# Patient Record
Sex: FEMALE | Race: WHITE | NOT HISPANIC OR LATINO | Employment: OTHER | URBAN - METROPOLITAN AREA
[De-identification: names, ages, dates, MRNs, and addresses within clinical notes are randomized per-mention and may not be internally consistent; named-entity substitution may affect disease eponyms.]

---

## 2019-04-04 ENCOUNTER — TELEPHONE (OUTPATIENT)
Dept: GENETICS | Facility: HOSPITAL | Age: 69
End: 2019-04-04

## 2019-04-18 ENCOUNTER — CLINICAL SUPPORT (OUTPATIENT)
Dept: GENETICS | Facility: HOSPITAL | Age: 69
End: 2019-04-18

## 2019-04-18 ENCOUNTER — APPOINTMENT (OUTPATIENT)
Dept: LAB | Facility: HOSPITAL | Age: 69
End: 2019-04-18

## 2019-04-18 DIAGNOSIS — Z80.3 FAMILY HISTORY OF BREAST CANCER: Primary | ICD-10-CM

## 2019-04-18 DIAGNOSIS — Z13.79 GENETIC TESTING: Primary | ICD-10-CM

## 2019-04-18 PROCEDURE — 96040: CPT | Performed by: GENETIC COUNSELOR, MS

## 2019-04-19 NOTE — PROGRESS NOTES
Aury Mejía is a 69-year-old female who was referred for genetic counseling due to a family history of breast cancer. Ms. Mejía has no personal history of cancer. She was 11-12 years old at menarche and had her first child at 20. She is post-menopausal and had a CHRIS-BSO in her 30s. Her current cancer screening includes annual clinical breast exam and annual mammogram. She was interested in discussing her risk for a hereditary cancer syndrome.  Ms. Mejía was interested in pursuing genetic testing, and therefore, the [facility] Cancer Panel was ordered which analyzes 47 genes associated with an increased cancer risk. Results from this testing are expected in approximately 2-3 weeks.    FAMILY HISTORY (see attached pedigree):    Mother:  Breast cancer, 41  Mat. Aunt:  Mouth cancer, 46    We do not have medical records confirming the diagnoses in Ms. Mejía’s family.    RISK ASSESSMENT:  Ms. Mejía’s family history of breast cancer led to concern regarding a hereditary cancer syndrome.  She meets NCCN guidelines criteria for BRCA1/2 testing based on family history of early onset breast cancer. Medicare does not cover testing for individuals who have not themselves had a cancer diagnosis; however, [facility], a genetic testing laboratory, will provide testing to Medicare patients in these circumstances when no affected relatives are available for testing.  Ms. Mejía opted to pursue multigene panel testing through [facility]. If genetic testing is negative, Ms. Mejía’s management should be guided by family history. These risk assessments are based on the family history information provided at the time of the appointment.  The assessments could change in the future should new information be obtained.    GENETIC COUNSELING (35 minutes):  We reviewed the family history information in detail. Cases of cancer follow three general patterns: sporadic, familial, and hereditary.  While most cancer is sporadic, some cases appear to  occur in family clusters.  These cases are said to be familial and account for 10-20% of cancer cases.  Familial cases may be due to a combination of shared genes and environmental factors among family members.  In even fewer families, the cancer is said to be inherited, and the genes responsible for the cancer are known.      Family histories typical of hereditary cancer syndromes usually include multiple first- and second-degree relatives diagnosed with cancer types that define a syndrome.  These cases tend to be diagnosed at younger-than-expected ages and can be bilateral or multifocal.  The cancer in these families follows an autosomal dominant inheritance pattern, which indicates the likely presence of a mutation in a cancer susceptibility gene.  Children and siblings of an individual believed to carry this mutation have a 50% chance of inheriting that mutation, thereby inheriting the increased risk to develop cancer.  These mutations can be passed down from the maternal or the paternal lineage.    Based on Ms. Mejía’ family history of breast cancer, we discussed that hereditary breast cancer accounts for 5-10% of all cases of breast cancer.  A significant proportion of hereditary breast cancer can be attributed to mutations in the BRCA1 and BRCA2 genes.  Mutations in these genes confer an increased risk for breast cancer, ovarian cancer, male breast cancer, prostate cancer, and pancreatic cancer.  Women with a BRCA1 or BRCA2 mutation have up to an 87% lifetime risk of breast cancer and up to a 44% risk of ovarian cancer. We discussed that there are other, more rare, hereditary cancer syndromes. Based on Ms. Mejía’ family history and her desire to get more information regarding her personal risks she opted to pursue testing through a panel evaluating several other genes known to increase the risk for cancer.    GENETIC TESTING:  The risks, benefits and limitations of genetic testing and implications for  clinical management following testing were reviewed. DNA test results can influence decisions regarding screening and prevention.  Genetic testing can have significant psychological implications for both individuals and families. Also discussed was the possibility of employment and insurance discrimination based on genetic test results and the federal and states laws that are in place to prevent this.         We discussed panel testing, which would involve testing 42 genes associated with increased cancer risk. The benefits and limitations of genetic testing were discussed.  The implications of a positive or negative test result were discussed.  We also discussed the importance of testing on an affected relative. We discussed the possibility that, in some cases, genetic test results may be ambiguous due to the identification of a genetic variant. These variants may or may not be associated with an increased cancer risk. With multigene panel testing, it is not uncommon for a variant of uncertain significance (VUS) to be identified.  If a VUS is identified, testing family members is not recommended and screening recommendations are made based on the family history.  The laboratories that perform genetic testing work to reclassify the VUS and send out an amended report if and when a VUS is reclassified.  The majority of variant findings are ultimately reclassified to a negative result. Given her family history, a negative test result does not eliminate all cancer risk, although the risk would not be as high as it would with positive genetic testing.     PLAN:  Genetic testing via the [facility] Cancer Panel was ordered and results are expected in 2-3 weeks. We will contact MsFrancisca Marykiarra with her results once they are received.      Ibis Ball MS, [facility] [place], [facility]  Licensed Certified Genetic Counselor

## 2019-05-03 ENCOUNTER — DOCUMENTATION (OUTPATIENT)
Dept: GENETICS | Facility: HOSPITAL | Age: 69
End: 2019-05-03

## 2019-05-03 NOTE — PROGRESS NOTES
Aury Mejía is a 69-year-old female who was referred for genetic counseling due to a family history of breast cancer. Ms. Mejía has no personal history of cancer. She was 11-12 years old at menarche and had her first child at 20. She is post-menopausal and had a CHRIS-BSO in her 30s. Her current cancer screening includes annual clinical breast exam and annual mammogram. She was interested in discussing her risk for a hereditary cancer syndrome.  Ms. Mejía was interested in pursuing genetic testing, and therefore, the [facility] Cancer Panel was ordered which analyzes 47 genes associated with an increased cancer risk. Genetic testing was negative for known deleterious/pathogenic (harmful) mutations in the 47 genes on this multigene panel (see attached results).  While no known deleterious mutations were identified, a variant of uncertain significance (VUS) was identified in the  SDHB gene.  Variants are changes in DNA that may or may not affect the function of the gene.  It is not uncommon for VUS’s to be reported during multigene panel testing, given the number of genes being evaluated and the presence of genetic variation in the population.  The majority (estimated to be >90%) of VUS’s are eventually reclassified as benign gene changes. In this case, the family history is not at all suspicious for a pathogenic SDHB mutation, further increasing the likelihood that this is a benign variant.  It is not recommended that other relatives be tested for this VUS, and this VUS does not impact Ms. Mejía’s management.  Management should be guided by family history assessments.  These results were discussed with Ms. Mejía by telephone on 5/3/19.    FAMILY HISTORY (see attached pedigree):    Mother:  Breast cancer, 41  Mat. Aunt:  Mouth cancer, 46    We do not have medical records confirming the diagnoses in Ms. Mejía’s family.    RISK ASSESSMENT:  Ms. Mejía’s family history of breast cancer led to concern regarding a  hereditary cancer syndrome.  She meets NCCN guidelines criteria for BRCA1/2 testing based on family history of early onset breast cancer. Ms. Mejía opted to pursue multigene panel testing through [facility]. If genetic testing is negative, a family history based risk assessment can be performed for Ms. Mejía and other female relatives.       Since genetic testing was negative for known pathogenic mutations, Ms. Mejía’s lifetime risk of developing breast cancer should be assessed using family history-based risk assessment models.  These models take into account family history and personal history factors, including age at menarche, age at first birth, breast biopsies, age at menopause, etc.  Using these models, Ms. Mejía’s breast cancer risk is estimated to be 5.0% (CHETAN/Reinier-Brie), which is very slightly elevated over the average 71-year-old woman’s lifetime risk 4.1%.   A lifetime risk greater than 20% warrants consideration of increased screening, therefore Ms. Mejía does not fall into this category. Ms. Mejía’s daughter could also have a risk assessment performed using the Tyrer-Cuzick model that would estimate her risk for breast cancer based on the family history.  If the lifetime breast cancer risk is estimated to be greater than 20%, increased screening includes annual breast MRI in addition to annual mammogram. These risk assessments are based on the family history information provided at the time of the appointment. The assessments could change in the future should new information be obtained.    GENETIC COUNSELING:  We reviewed the family history information in detail. Cases of cancer follow three general patterns: sporadic, familial, and hereditary.  While most cancer is sporadic, some cases appear to occur in family clusters.  These cases are said to be familial and account for 10-20% of cancer cases.  Familial cases may be due to a combination of shared genes and environmental factors among family  members.  In even fewer families, the cancer is said to be inherited, and the genes responsible for the cancer are known.      Family histories typical of hereditary cancer syndromes usually include multiple first- and second-degree relatives diagnosed with cancer types that define a syndrome.  These cases tend to be diagnosed at younger-than-expected ages and can be bilateral or multifocal.  The cancer in these families follows an autosomal dominant inheritance pattern, which indicates the likely presence of a mutation in a cancer susceptibility gene.  Children and siblings of an individual believed to carry this mutation have a 50% chance of inheriting that mutation, thereby inheriting the increased risk to develop cancer.  These mutations can be passed down from the maternal or the paternal lineage.    Based on Ms. Mejía’s family history of breast cancer, we discussed that hereditary breast cancer accounts for 5-10% of all cases of breast cancer.  A significant proportion of hereditary breast cancer can be attributed to mutations in the BRCA1 and BRCA2 genes.  Mutations in these genes confer an increased risk for breast cancer, ovarian cancer, male breast cancer, prostate cancer, and pancreatic cancer.  Women with a BRCA1 or BRCA2 mutation have up to an 87% lifetime risk of breast cancer and up to a 44% risk of ovarian cancer. We discussed that there are other, more rare, hereditary cancer syndromes. Based on Ms. Mejía’s family history and her desire to get more information regarding her personal risks she opted to pursue testing through a panel evaluating several other genes known to increase the risk for cancer.    GENETIC TESTING:  The risks, benefits and limitations of genetic testing and implications for clinical management following testing were reviewed. DNA test results can influence decisions regarding screening and prevention.  Genetic testing can have significant psychological implications for both  individuals and families. Also discussed was the possibility of employment and insurance discrimination based on genetic test results and the federal and states laws that are in place to prevent this.         We discussed panel testing, which would involve testing 47 genes associated with increased cancer risk. The benefits and limitations of genetic testing were discussed.  The implications of a positive or negative test result were discussed.  We also discussed the importance of testing on an affected relative. We discussed the possibility that, in some cases, genetic test results may be ambiguous due to the identification of a genetic variant. These variants may or may not be associated with an increased cancer risk. With multigene panel testing, it is not uncommon for a variant of uncertain significance (VUS) to be identified.  If a VUS is identified, testing family members is not recommended and screening recommendations are made based on the family history.  The laboratories that perform genetic testing work to reclassify the VUS and send out an amended report if and when a VUS is reclassified.  The majority of variant findings are ultimately reclassified to a negative result. Given her family history, a negative test result does not eliminate all cancer risk, although the risk would not be as high as it would with positive genetic testing.       TEST RESULTS:  Genetic testing was negative for known deleterious/pathogenic (harmful) mutations by sequencing and rearrangement testing for the 47 genes on the [facility] Hereditary Cancers panel.    A variant of uncertain significance (VUS) was identified in the SDHB gene, however the majority of VUS’s are ultimately reclassified as benign, and therefore this result is not clinically actionable.  If the variant is ever reclassified a new report will be issued by the laboratory and released directly to the ordering physician, and our office.  We are unable to determine  why Ms. Mejía’s relatives developed cancer at this time.  It is possible that there is a mutation present in the family that Ms. Mejía did not happen to inherit.  At this time, general population screening is recommended for Ms. Mejía. This assessment is based on the information provided at the time of the consultation.      PLAN: Genetic counseling remains available for Ms. Mejía and her family. If Ms. Mejía has any questions, concerns, or updates to the family history she is welcome to call us.       Ibis Ball MS, [facility] [place], [facility]  Licensed Certified Genetic Counselor    Cc: NIELS Velazquez

## 2019-08-12 PROBLEM — G43.909 MIGRAINE: Status: ACTIVE | Noted: 2017-01-18

## 2019-08-12 PROBLEM — E03.9 HYPOTHYROIDISM: Status: ACTIVE | Noted: 2017-01-18

## 2019-08-12 PROBLEM — E78.5 HYPERLIPIDEMIA: Status: ACTIVE | Noted: 2017-01-18

## 2019-10-07 RX ORDER — SODIUM, POTASSIUM,MAG SULFATES 17.5-3.13G
SOLUTION, RECONSTITUTED, ORAL ORAL
Qty: 2 BOTTLE | Refills: 0 | Status: SHIPPED | OUTPATIENT
Start: 2019-10-07

## 2019-10-15 ENCOUNTER — OUTSIDE FACILITY SERVICE (OUTPATIENT)
Dept: GASTROENTEROLOGY | Facility: CLINIC | Age: 69
End: 2019-10-15

## 2019-10-15 ENCOUNTER — LAB REQUISITION (OUTPATIENT)
Dept: LAB | Facility: HOSPITAL | Age: 69
End: 2019-10-15

## 2019-10-15 DIAGNOSIS — Z12.11 ENCOUNTER FOR SCREENING FOR MALIGNANT NEOPLASM OF COLON: ICD-10-CM

## 2019-10-15 PROCEDURE — 88305 TISSUE EXAM BY PATHOLOGIST: CPT | Performed by: INTERNAL MEDICINE

## 2019-10-15 PROCEDURE — 45385 COLONOSCOPY W/LESION REMOVAL: CPT | Performed by: INTERNAL MEDICINE

## 2019-10-16 LAB
CYTO UR: NORMAL
LAB AP CASE REPORT: NORMAL
LAB AP CLINICAL INFORMATION: NORMAL
PATH REPORT.FINAL DX SPEC: NORMAL
PATH REPORT.GROSS SPEC: NORMAL

## 2021-01-21 ENCOUNTER — IMMUNIZATION (OUTPATIENT)
Dept: VACCINE CLINIC | Facility: HOSPITAL | Age: 71
End: 2021-01-21

## 2021-01-21 PROCEDURE — 91300 HC SARSCOV02 VAC 30MCG/0.3ML IM: CPT | Performed by: INTERNAL MEDICINE

## 2021-01-21 PROCEDURE — 0001A: CPT | Performed by: INTERNAL MEDICINE

## 2021-02-10 ENCOUNTER — IMMUNIZATION (OUTPATIENT)
Dept: VACCINE CLINIC | Facility: HOSPITAL | Age: 71
End: 2021-02-10

## 2021-02-10 PROCEDURE — 0002A: CPT | Performed by: INTERNAL MEDICINE

## 2021-02-10 PROCEDURE — 91300 HC SARSCOV02 VAC 30MCG/0.3ML IM: CPT | Performed by: INTERNAL MEDICINE

## 2021-10-01 ENCOUNTER — OFFICE VISIT (OUTPATIENT)
Dept: GASTROENTEROLOGY | Facility: CLINIC | Age: 71
End: 2021-10-01

## 2021-10-01 VITALS
HEART RATE: 73 BPM | HEIGHT: 63 IN | TEMPERATURE: 97.5 F | BODY MASS INDEX: 27.14 KG/M2 | DIASTOLIC BLOOD PRESSURE: 71 MMHG | WEIGHT: 153.2 LBS | SYSTOLIC BLOOD PRESSURE: 147 MMHG

## 2021-10-01 DIAGNOSIS — R05.9 COUGH: Primary | ICD-10-CM

## 2021-10-01 PROCEDURE — 99213 OFFICE O/P EST LOW 20 MIN: CPT | Performed by: NURSE PRACTITIONER

## 2021-10-01 RX ORDER — SOLIFENACIN SUCCINATE 10 MG/1
10 TABLET, FILM COATED ORAL DAILY
COMMUNITY
Start: 2021-09-10

## 2021-10-01 RX ORDER — LEVOTHYROXINE SODIUM 0.1 MG/1
100 TABLET ORAL DAILY
COMMUNITY
Start: 2021-09-30

## 2021-10-01 RX ORDER — OMEPRAZOLE 40 MG/1
40 CAPSULE, DELAYED RELEASE ORAL
Qty: 180 CAPSULE | Refills: 3 | Status: SHIPPED | OUTPATIENT
Start: 2021-10-01 | End: 2022-08-11 | Stop reason: SDUPTHER

## 2021-10-01 RX ORDER — FLUVOXAMINE MALEATE 100 MG
100 TABLET ORAL NIGHTLY
COMMUNITY
Start: 2021-07-29

## 2021-10-01 RX ORDER — AZELASTINE HYDROCHLORIDE 0.5 MG/ML
1 SOLUTION/ DROPS OPHTHALMIC DAILY
COMMUNITY

## 2021-10-01 RX ORDER — FAMOTIDINE 20 MG/1
20 TABLET, FILM COATED ORAL
COMMUNITY
Start: 2021-07-29 | End: 2021-10-01

## 2021-10-01 RX ORDER — BUSPIRONE HYDROCHLORIDE 5 MG/1
5 TABLET ORAL DAILY PRN
COMMUNITY
Start: 2021-06-17

## 2021-10-01 RX ORDER — TRIAMCINOLONE ACETONIDE 55 UG/1
2 SPRAY, METERED NASAL
COMMUNITY

## 2021-10-01 RX ORDER — LEVOCETIRIZINE DIHYDROCHLORIDE 5 MG/1
5 TABLET, FILM COATED ORAL DAILY PRN
COMMUNITY

## 2021-10-01 RX ORDER — OMEPRAZOLE 20 MG/1
20 CAPSULE, DELAYED RELEASE ORAL DAILY
COMMUNITY
Start: 2021-08-09 | End: 2021-10-01 | Stop reason: DRUGHIGH

## 2021-10-01 RX ORDER — CONJUGATED ESTROGENS 0.62 MG/G
0.65 CREAM VAGINAL
COMMUNITY

## 2021-10-01 NOTE — PROGRESS NOTES
GASTROENTEROLOGY OFFICE NOTE  Aury Mejía  [id number]  1950    CARE TEAM  Patient Care Team:  Kaelyn Weber APRN as PCP - General (Nurse Practitioner)    Referring Provider: Kaelyn Weber APRN    Chief Complaint   Patient presents with   • Cough     green sputum        HISTORY OF PRESENT ILLNESS:  Patient is a 71-year-old female who presents today with complaints of a persistent cough that has been ongoing for the past 4 to 5 months.  She is having a hard time sleeping at night.  She clears her throat very frequently.  There are times when she feels she has congestion in her chest and can cough up some green sputum but this is not consistent.  She was started on omeprazole 20 mg daily when her symptoms first began and did not show any improvement, about 5 weeks ago this was increased to omeprazole 20 mg twice daily and she discontinued use of her blood pressure medication.  She has some very minimal improvement, if any with the increase of omeprazole.  Over the past couple of years or so, she has developed problems with acid reflux but only after eating very spicy foods otherwise denies GERD and is also absent dysphagia, odynophagia, early satiety, nausea, vomiting.    She has an upcoming appointment with pulmonology for evaluation.    PAST MEDICAL HISTORY  History reviewed. No pertinent past medical history.     PAST SURGICAL HISTORY  Past Surgical History:   Procedure Laterality Date   • CHOLECYSTECTOMY  2018        MEDICATIONS:    Current Outpatient Medications:   •  budesonide-formoterol (SYMBICORT) 80-4.5 MCG/ACT inhaler, Inhale 2 puffs 2 (Two) Times a Day., Disp: , Rfl:   •  busPIRone (BUSPAR) 5 MG tablet, Take 5 mg by mouth Daily As Needed., Disp: , Rfl:   •  cetirizine (ZYRTEC ALLERGY) 10 MG tablet, Take 10 mg by mouth Daily., Disp: , Rfl:   •  ezetimibe (ZETIA) 10 MG tablet, Take 10 mg by mouth Daily., Disp: , Rfl:   •  raloxifene (EVISTA) 60 MG tablet, Take 60 mg by mouth  Daily., Disp: , Rfl:   •  SUMAtriptan (IMITREX) 100 MG tablet, Take 100 mg by mouth Daily As Needed., Disp: , Rfl:   •  venlafaxine XR (EFFEXOR-XR) 75 MG 24 hr capsule, Take 75 mg by mouth Daily., Disp: , Rfl:   •  zolpidem (AMBIEN) 10 MG tablet, Take 10 mg by mouth every night at bedtime., Disp: , Rfl:   •  albuterol sulfate HFA (PROAIR HFA) 108 (90 Base) MCG/ACT inhaler, Inhale 2 puffs Every 4 (Four) Hours As Needed., Disp: , Rfl:   •  azelastine (OPTIVAR) 0.05 % ophthalmic solution, Apply 1 drop to eye(s) as directed by provider Daily., Disp: , Rfl:   •  conjugated estrogens (Premarin) 0.625 MG/GM vaginal cream, Insert 0.65 mg into the vagina 3 (Three) Times a Week if Needed., Disp: , Rfl:   •  fluvoxaMINE (LUVOX) 100 MG tablet, Take 100 mg by mouth Every Night., Disp: , Rfl:   •  levocetirizine (Xyzal) 5 MG tablet, Take 5 mg by mouth Daily As Needed., Disp: , Rfl:   •  levothyroxine (SYNTHROID, LEVOTHROID) 100 MCG tablet, Take 100 mcg by mouth Daily., Disp: , Rfl:   •  omeprazole (priLOSEC) 40 MG capsule, Take 1 capsule by mouth 2 (Two) Times a Day Before Meals., Disp: 180 capsule, Rfl: 3  •  sodium-potassium-magnesium sulfates (SUPREP BOWEL PREP KIT) 17.5-3.13-1.6 GM/177ML solution oral solution, Please follow the directions mailed to you by our office. If you have any questions call our office at [phone], Disp: 2 bottle, Rfl: 0  •  solifenacin (VESICARE) 10 MG tablet, Take 10 mg by mouth Daily., Disp: , Rfl:   •  Triamcinolone Acetonide (Nasal Allergy 24 Hour) 55 MCG/ACT nasal inhaler, 2 mcg., Disp: , Rfl:     ALLERGIES  Allergies   Allergen Reactions   • Lidocaine Other (See Comments)     Blisters in mouth.   • Lipitor [Atorvastatin] Urinary Retention   • Prilocaine Other (See Comments)     Blisters in mouth.   • Tetracaine Other (See Comments)     Blisters in mouth.   • Spiriva Respimat [Tiotropium Bromide Monohydrate] Rash       FAMILY HISTORY:  History reviewed. No pertinent family history.    SOCIAL  "HISTORY  Social History     Socioeconomic History   • Marital status:      Spouse name: Not on file   • Number of children: Not on file   • Years of education: Not on file   • Highest education level: Not on file   Tobacco Use   • Smoking status: Never Smoker   Vaping Use   • Vaping Use: Never used   Substance and Sexual Activity   • Alcohol use: Not Currently       REVIEW OF SYSTEMS  Review of Systems   Constitutional: Negative for activity change, appetite change, chills, diaphoresis, fatigue, fever, unexpected weight gain and unexpected weight loss.   HENT: Negative for trouble swallowing and voice change.    Respiratory: Positive for cough.    Gastrointestinal: Negative for abdominal distention, abdominal pain, anal bleeding, blood in stool, constipation, diarrhea, nausea, rectal pain, vomiting, GERD and indigestion.         PHYSICAL EXAM   /71 (BP Location: Left arm, Patient Position: Sitting, Cuff Size: Adult)   Pulse 73   Temp 97.5 °F (36.4 °C) (Temporal)   Ht 160 cm (63\")   Wt 69.5 kg (153 lb 3.2 oz)   BMI 27.14 kg/m²   Physical Exam  Constitutional:       General: She is not in acute distress.     Appearance: She is well-developed.   HENT:      Head: Normocephalic and atraumatic.      Nose: Nose normal.   Eyes:      Conjunctiva/sclera: Conjunctivae normal.      Pupils: Pupils are equal, round, and reactive to light.   Pulmonary:      Effort: Pulmonary effort is normal.   Abdominal:      General: Bowel sounds are normal. There is no distension.      Palpations: Abdomen is soft.   Neurological:      Mental Status: She is alert and oriented to person, place, and time.   Psychiatric:         Behavior: Behavior normal.         Judgment: Judgment normal.           ASSESSMENT / PLAN  1.  Persistent cough  -Increase omeprazole to 40 mg twice daily    Return in about 8 weeks (around 11/26/2021).    I discussed the patients findings and my recommendations with patient    Eyal Johnson, " APRN

## 2021-12-14 ENCOUNTER — APPOINTMENT (OUTPATIENT)
Dept: PREADMISSION TESTING | Facility: HOSPITAL | Age: 71
End: 2021-12-14

## 2021-12-14 ENCOUNTER — OFFICE VISIT (OUTPATIENT)
Dept: GASTROENTEROLOGY | Facility: CLINIC | Age: 71
End: 2021-12-14

## 2021-12-14 VITALS
DIASTOLIC BLOOD PRESSURE: 67 MMHG | WEIGHT: 143.6 LBS | SYSTOLIC BLOOD PRESSURE: 156 MMHG | TEMPERATURE: 97.5 F | BODY MASS INDEX: 25.44 KG/M2 | HEART RATE: 72 BPM

## 2021-12-14 DIAGNOSIS — Z20.822 ENCOUNTER FOR PREPROCEDURE SCREENING LABORATORY TESTING FOR COVID-19: Primary | ICD-10-CM

## 2021-12-14 DIAGNOSIS — K21.9 GASTROESOPHAGEAL REFLUX DISEASE, UNSPECIFIED WHETHER ESOPHAGITIS PRESENT: Primary | ICD-10-CM

## 2021-12-14 DIAGNOSIS — Z01.812 ENCOUNTER FOR PREPROCEDURE SCREENING LABORATORY TESTING FOR COVID-19: Primary | ICD-10-CM

## 2021-12-14 LAB — SARS-COV-2 RNA PNL SPEC NAA+PROBE: NOT DETECTED

## 2021-12-14 PROCEDURE — U0005 INFEC AGEN DETEC AMPLI PROBE: HCPCS

## 2021-12-14 PROCEDURE — 99213 OFFICE O/P EST LOW 20 MIN: CPT | Performed by: NURSE PRACTITIONER

## 2021-12-14 PROCEDURE — C9803 HOPD COVID-19 SPEC COLLECT: HCPCS

## 2021-12-14 PROCEDURE — U0004 COV-19 TEST NON-CDC HGH THRU: HCPCS

## 2021-12-14 NOTE — PROGRESS NOTES
GASTROENTEROLOGY OFFICE NOTE  Aury Mejía  [id number]  1950    CARE TEAM  Patient Care Team:  Kaelyn Weber APRN as PCP - General (Nurse Practitioner)    Referring Provider: Kaelyn Weber APRN    Chief Complaint   Patient presents with   • Follow-up   • Cough        HISTORY OF PRESENT ILLNESS:  Patient returns in follow-up with a chronic cough, persistent for the past 4 to 5 months with additional complaints of frequent clearing of the throat.  At her last visit Protonix was increased to 40 mg twice daily.  She has noted some improvement, she states she is better but not completely resolved.  Since her last visit, she has consulted with pulmonology where an esophagram was done that was significant for extensive gastroesophageal reflux.    PAST MEDICAL HISTORY  Past Medical History:   Diagnosis Date   • GERD (gastroesophageal reflux disease)         PAST SURGICAL HISTORY  Past Surgical History:   Procedure Laterality Date   • CHOLECYSTECTOMY  2018   • HYSTERECTOMY  2000   • REPLACEMENT TOTAL KNEE Bilateral 2008   • THYROIDECTOMY, PARTIAL  1990        MEDICATIONS:    Current Outpatient Medications:   •  albuterol sulfate HFA (PROAIR HFA) 108 (90 Base) MCG/ACT inhaler, Inhale 2 puffs Every 4 (Four) Hours As Needed., Disp: , Rfl:   •  azelastine (OPTIVAR) 0.05 % ophthalmic solution, Apply 1 drop to eye(s) as directed by provider Daily., Disp: , Rfl:   •  budesonide-formoterol (SYMBICORT) 80-4.5 MCG/ACT inhaler, Inhale 2 puffs 2 (Two) Times a Day., Disp: , Rfl:   •  busPIRone (BUSPAR) 5 MG tablet, Take 5 mg by mouth Daily As Needed., Disp: , Rfl:   •  cetirizine (ZYRTEC ALLERGY) 10 MG tablet, Take 10 mg by mouth Daily., Disp: , Rfl:   •  conjugated estrogens (Premarin) 0.625 MG/GM vaginal cream, Insert 0.65 mg into the vagina 3 (Three) Times a Week if Needed., Disp: , Rfl:   •  ezetimibe (ZETIA) 10 MG tablet, Take 10 mg by mouth Daily., Disp: , Rfl:   •  fluvoxaMINE (LUVOX) 100 MG tablet,  Take 100 mg by mouth Every Night., Disp: , Rfl:   •  levocetirizine (Xyzal) 5 MG tablet, Take 5 mg by mouth Daily As Needed., Disp: , Rfl:   •  levothyroxine (SYNTHROID, LEVOTHROID) 100 MCG tablet, Take 100 mcg by mouth Daily., Disp: , Rfl:   •  omeprazole (priLOSEC) 40 MG capsule, Take 1 capsule by mouth 2 (Two) Times a Day Before Meals., Disp: 180 capsule, Rfl: 3  •  raloxifene (EVISTA) 60 MG tablet, Take 60 mg by mouth Daily., Disp: , Rfl:   •  sodium-potassium-magnesium sulfates (SUPREP BOWEL PREP KIT) 17.5-3.13-1.6 GM/177ML solution oral solution, Please follow the directions mailed to you by our office. If you have any questions call our office at [phone], Disp: 2 bottle, Rfl: 0  •  solifenacin (VESICARE) 10 MG tablet, Take 10 mg by mouth Daily., Disp: , Rfl:   •  SUMAtriptan (IMITREX) 100 MG tablet, Take 100 mg by mouth Daily As Needed., Disp: , Rfl:   •  Triamcinolone Acetonide (Nasal Allergy 24 Hour) 55 MCG/ACT nasal inhaler, 2 mcg., Disp: , Rfl:   •  venlafaxine XR (EFFEXOR-XR) 75 MG 24 hr capsule, Take 75 mg by mouth Daily., Disp: , Rfl:   •  zolpidem (AMBIEN) 10 MG tablet, Take 10 mg by mouth every night at bedtime., Disp: , Rfl:     ALLERGIES  Allergies   Allergen Reactions   • Atorvastatin Urinary Retention   • Lidocaine Other (See Comments)     Blisters in mouth.   • Prilocaine Other (See Comments)     Blisters in mouth.   • Tetracaine Other (See Comments)     Blisters in mouth.   • Contrast Dye Rash   • Iodine Rash   • Sulfa Antibiotics Rash   • Tiotropium Bromide Monohydrate Rash       FAMILY HISTORY:  Family History   Problem Relation Age of Onset   • Colon cancer Neg Hx    • Colon polyps Neg Hx        SOCIAL HISTORY  Social History     Socioeconomic History   • Marital status:    Tobacco Use   • Smoking status: Never Smoker   • Smokeless tobacco: Never Used   Vaping Use   • Vaping Use: Never used   Substance and Sexual Activity   • Alcohol use: Not Currently   • Drug use: Never   •  Sexual activity: Defer           PHYSICAL EXAM   /67 (BP Location: Left arm, Patient Position: Sitting, Cuff Size: Adult)   Pulse 72   Temp 97.5 °F (36.4 °C) (Temporal)   Wt 65.1 kg (143 lb 9.6 oz)   BMI 25.44 kg/m²   Physical Exam  Constitutional:       General: She is not in acute distress.     Appearance: She is well-developed.   HENT:      Head: Normocephalic and atraumatic.      Nose: Nose normal.   Eyes:      Conjunctiva/sclera: Conjunctivae normal.      Pupils: Pupils are equal, round, and reactive to light.   Pulmonary:      Effort: Pulmonary effort is normal.   Abdominal:      General: There is no distension.      Palpations: Abdomen is soft.      Tenderness: There is no abdominal tenderness.   Neurological:      Mental Status: She is alert and oriented to person, place, and time.   Psychiatric:         Behavior: Behavior normal.         Judgment: Judgment normal.           Results Review:  Exam date: 10/21/2021  Exam: RF esophagus  -Impression 1.  Small sliding hiatus hernia  2.  Significant, extensive gastroesophageal reflux    ASSESSMENT / PLAN  1.  GERD  -Pantoprazole 40 mg twice daily  -EGD    No follow-ups on file.    I discussed the patients findings and my recommendations with patient    NIELS Garcia

## 2021-12-17 ENCOUNTER — OUTSIDE FACILITY SERVICE (OUTPATIENT)
Dept: GASTROENTEROLOGY | Facility: CLINIC | Age: 71
End: 2021-12-17

## 2021-12-17 PROCEDURE — 43239 EGD BIOPSY SINGLE/MULTIPLE: CPT | Performed by: INTERNAL MEDICINE

## 2021-12-17 PROCEDURE — 88305 TISSUE EXAM BY PATHOLOGIST: CPT | Performed by: INTERNAL MEDICINE

## 2021-12-20 ENCOUNTER — LAB REQUISITION (OUTPATIENT)
Dept: LAB | Facility: HOSPITAL | Age: 71
End: 2021-12-20

## 2021-12-20 DIAGNOSIS — K21.9 GASTRO-ESOPHAGEAL REFLUX DISEASE WITHOUT ESOPHAGITIS: ICD-10-CM

## 2021-12-20 DIAGNOSIS — K20.90 ESOPHAGITIS, UNSPECIFIED WITHOUT BLEEDING: ICD-10-CM

## 2021-12-20 DIAGNOSIS — K44.9 DIAPHRAGMATIC HERNIA WITHOUT OBSTRUCTION OR GANGRENE: ICD-10-CM

## 2022-03-02 ENCOUNTER — TELEMEDICINE (OUTPATIENT)
Dept: GASTROENTEROLOGY | Facility: CLINIC | Age: 72
End: 2022-03-02

## 2022-03-02 DIAGNOSIS — K21.9 GASTROESOPHAGEAL REFLUX DISEASE, UNSPECIFIED WHETHER ESOPHAGITIS PRESENT: Primary | ICD-10-CM

## 2022-03-02 PROCEDURE — 99212 OFFICE O/P EST SF 10 MIN: CPT | Performed by: NURSE PRACTITIONER

## 2022-03-02 NOTE — PROGRESS NOTES
GASTROENTEROLOGY OFFICE NOTE  Aury Mejía  [id number]  1950    CARE TEAM  Patient Care Team:  Kaelyn Weber APRN as PCP - General (Nurse Practitioner)    Referring Provider: Kaelyn Weber APRN    Chief Complaint   Patient presents with   • Cough      Mode of Visit: Video  Location of patient: home  You have chosen to receive care through a telehealth visit.  The patient has signed the video visit consent form.  The visit included audio and video interaction. No technical issues occurred during this visit.     HISTORY OF PRESENT ILLNESS:  Ms. Milner returns in follow-up for complaints of persistent cough secondary to GERD. She is been taking pantoprazole 40 mg twice daily for about 8 weeks now and reports that her cough has completely resolved. She denies dysphagia, odynophagia, early satiety, nausea or vomiting.    PAST MEDICAL HISTORY  Past Medical History:   Diagnosis Date   • GERD (gastroesophageal reflux disease)         PAST SURGICAL HISTORY  Past Surgical History:   Procedure Laterality Date   • CHOLECYSTECTOMY  2018   • HYSTERECTOMY  2000   • REPLACEMENT TOTAL KNEE Bilateral 2008   • THYROIDECTOMY, PARTIAL  1990        MEDICATIONS:    Current Outpatient Medications:   •  albuterol sulfate HFA (PROAIR HFA) 108 (90 Base) MCG/ACT inhaler, Inhale 2 puffs Every 4 (Four) Hours As Needed., Disp: , Rfl:   •  azelastine (OPTIVAR) 0.05 % ophthalmic solution, Apply 1 drop to eye(s) as directed by provider Daily., Disp: , Rfl:   •  budesonide-formoterol (SYMBICORT) 80-4.5 MCG/ACT inhaler, Inhale 2 puffs 2 (Two) Times a Day., Disp: , Rfl:   •  busPIRone (BUSPAR) 5 MG tablet, Take 5 mg by mouth Daily As Needed., Disp: , Rfl:   •  cetirizine (ZYRTEC ALLERGY) 10 MG tablet, Take 10 mg by mouth Daily., Disp: , Rfl:   •  conjugated estrogens (Premarin) 0.625 MG/GM vaginal cream, Insert 0.65 mg into the vagina 3 (Three) Times a Week if Needed., Disp: , Rfl:   •  ezetimibe (ZETIA) 10 MG tablet,  Take 10 mg by mouth Daily., Disp: , Rfl:   •  fluvoxaMINE (LUVOX) 100 MG tablet, Take 100 mg by mouth Every Night., Disp: , Rfl:   •  levocetirizine (Xyzal) 5 MG tablet, Take 5 mg by mouth Daily As Needed., Disp: , Rfl:   •  levothyroxine (SYNTHROID, LEVOTHROID) 100 MCG tablet, Take 100 mcg by mouth Daily., Disp: , Rfl:   •  omeprazole (priLOSEC) 40 MG capsule, Take 1 capsule by mouth 2 (Two) Times a Day Before Meals., Disp: 180 capsule, Rfl: 3  •  raloxifene (EVISTA) 60 MG tablet, Take 60 mg by mouth Daily., Disp: , Rfl:   •  sodium-potassium-magnesium sulfates (SUPREP BOWEL PREP KIT) 17.5-3.13-1.6 GM/177ML solution oral solution, Please follow the directions mailed to you by our office. If you have any questions call our office at [phone], Disp: 2 bottle, Rfl: 0  •  solifenacin (VESICARE) 10 MG tablet, Take 10 mg by mouth Daily., Disp: , Rfl:   •  SUMAtriptan (IMITREX) 100 MG tablet, Take 100 mg by mouth Daily As Needed., Disp: , Rfl:   •  Triamcinolone Acetonide (Nasal Allergy 24 Hour) 55 MCG/ACT nasal inhaler, 2 mcg., Disp: , Rfl:   •  venlafaxine XR (EFFEXOR-XR) 75 MG 24 hr capsule, Take 75 mg by mouth Daily., Disp: , Rfl:   •  zolpidem (AMBIEN) 10 MG tablet, Take 10 mg by mouth every night at bedtime., Disp: , Rfl:     ALLERGIES  Allergies   Allergen Reactions   • Atorvastatin Urinary Retention   • Lidocaine Other (See Comments)     Blisters in mouth.   • Prilocaine Other (See Comments)     Blisters in mouth.   • Tetracaine Other (See Comments)     Blisters in mouth.   • Contrast Dye Rash   • Iodine Rash   • Sulfa Antibiotics Rash   • Tiotropium Bromide Monohydrate Rash       FAMILY HISTORY:  Family History   Problem Relation Age of Onset   • Colon cancer Neg Hx    • Colon polyps Neg Hx        SOCIAL HISTORY  Social History     Socioeconomic History   • Marital status:    Tobacco Use   • Smoking status: Never Smoker   • Smokeless tobacco: Never Used   Vaping Use   • Vaping Use: Never used    Substance and Sexual Activity   • Alcohol use: Not Currently   • Drug use: Never   • Sexual activity: Defer           PHYSICAL EXAM   There were no vitals taken for this visit.  Physical Exam  Constitutional:       Appearance: Normal appearance.   HENT:      Head: Normocephalic and atraumatic.   Pulmonary:      Effort: Pulmonary effort is normal.   Neurological:      Mental Status: She is alert and oriented to person, place, and time.   Psychiatric:         Mood and Affect: Mood normal.         Thought Content: Thought content normal.         ASSESSMENT / PLAN  1. GERD  2. Cough-now resolved  -Decrease pantoprazole to 40 mg daily    Return if symptoms worsen or fail to improve.    I discussed the patients findings and my recommendations with patient    NIELS Garcia

## 2022-08-11 NOTE — TELEPHONE ENCOUNTER
Rx Refill Note  Requested Prescriptions     Pending Prescriptions Disp Refills   • omeprazole (priLOSEC) 40 MG capsule 180 capsule 3     Sig: Take 1 capsule by mouth 2 (Two) Times a Day Before Meals.      Last office visit with prescribing clinician: 12/14/2021      Next office visit with prescribing clinician: Visit date not found            Kamila Abdalla LPN  08/11/22, 15:23 EDT

## 2022-08-12 RX ORDER — OMEPRAZOLE 40 MG/1
40 CAPSULE, DELAYED RELEASE ORAL
Qty: 180 CAPSULE | Refills: 0 | Status: SHIPPED | OUTPATIENT
Start: 2022-08-12 | End: 2022-11-02

## 2022-11-02 RX ORDER — OMEPRAZOLE 40 MG/1
CAPSULE, DELAYED RELEASE ORAL
Qty: 180 CAPSULE | Refills: 0 | Status: SHIPPED | OUTPATIENT
Start: 2022-11-02 | End: 2023-01-03

## 2022-11-02 NOTE — TELEPHONE ENCOUNTER
Rx Refill Note  Requested Prescriptions     Pending Prescriptions Disp Refills   • omeprazole (priLOSEC) 40 MG capsule [Pharmacy Med Name: Omeprazole 40 MG Oral Capsule Delayed Release] 180 capsule 3     Sig: TAKE 1 CAPSULE BY MOUTH  TWICE DAILY BEFORE MEALS      Last office visit with prescribing clinician: 12/14/2021      Next office visit with prescribing clinician: Visit date not found            Kamila Abdalla LPN  11/02/22, 09:36 EDT

## 2023-01-03 RX ORDER — OMEPRAZOLE 40 MG/1
CAPSULE, DELAYED RELEASE ORAL
Qty: 180 CAPSULE | Refills: 0 | Status: SHIPPED | OUTPATIENT
Start: 2023-01-03

## 2023-01-03 NOTE — TELEPHONE ENCOUNTER
Rx Refill Note  Requested Prescriptions     Pending Prescriptions Disp Refills   • omeprazole (priLOSEC) 40 MG capsule [Pharmacy Med Name: Omeprazole 40 MG Oral Capsule Delayed Release] 180 capsule 3     Sig: TAKE 1 CAPSULE BY MOUTH TWICE  DAILY BEFORE MEALS      Last office visit with prescribing clinician: 12/14/2021   Last telemedicine visit with prescribing clinician: Visit date not found   Next office visit with prescribing clinician: Visit date not found                         Would you like a call back once the refill request has been completed: [] Yes [] No    If the office needs to give you a call back, can they leave a voicemail: [] Yes [] No    Kamila Abdalla LPN  01/03/23, 10:37 EST